# Patient Record
Sex: FEMALE | Race: WHITE | Employment: FULL TIME | URBAN - METROPOLITAN AREA
[De-identification: names, ages, dates, MRNs, and addresses within clinical notes are randomized per-mention and may not be internally consistent; named-entity substitution may affect disease eponyms.]

---

## 2022-09-19 ENCOUNTER — NURSE TRIAGE (OUTPATIENT)
Dept: NURSING | Facility: CLINIC | Age: 22
End: 2022-09-19

## 2022-09-19 NOTE — TELEPHONE ENCOUNTER
"Nurse Triage SBAR    Is this a 2nd Level Triage? NO    Situation: Patient calling with fast heart rate and mild chest pain radiating into neck, jaw. .  Consent: not needed    Background:  Pt is a student here from the [place].  Staying in student houseing.  Today hs been having  \"Heart palpitations\" , really strong beating throughout my body today.     Assessment:   HEARTRATE:  Fast - 109 bpm at this time, Pt denies skipped or extra beats.   STARTED:  Over the weekend - felt a little fast upon standing, and now it is quite constant.   CONSTANT at this time.   Pt is reporting a regular but fast heart rate.  States \"it doesn't feel irregular\".   Pt denies having this before.   Denies hx of heart disease.    Pt reporting mild chest pain.  States \"it feels quite like it's pounding quite hard\".  Reports feeling it in her collar bones, up neck, and in shoulders.  States she has a headache as a result.      Protocol Recommended Disposition:   ED NOW    Recommendation: Advised patient to Go to ED now . Reviewed concerning symptoms and when to call back.  Pt declined to be seen in the ED at this time.  States she feels it's getting a bit better.  Writer can hear that pt continues to have a shaky voice and taking shaky breaths.  Advised again to be seen in ED, but pt continues to decline.  States she will call back if she gets any worse.      Isabel Blevins RN [facility] 9/19/2022 4:58 PM    Reason for Disposition    Chest pain    Pain also in shoulder(s) or arm(s) or jaw (Exception: pain is clearly made worse by movement)    Additional Information    Negative: Shock suspected (e.g., cold/pale/clammy skin, too weak to stand, low BP, rapid pulse)    Negative: Passed out (i.e., lost consciousness, collapsed and was not responding)    Negative: Difficult to awaken or acting confused (e.g., disoriented, slurred speech)    Negative: Visible sweat on face or sweat dripping down face    Negative: Unable to " walk, or can only walk with assistance (e.g., requires support)    Negative: [1] Received SHOCK from implantable cardiac defibrillator AND [2] persisting symptoms (i.e., palpitations, lightheadedness)    Negative: [1] Dizziness, lightheadedness, or weakness AND [2] heart beating very rapidly (e.g., > 140 / minute)    Negative: [1] Dizziness, lightheadedness, or weakness AND [2] heart beating very slowly (e.g., < 50 / minute)    Negative: Sounds like a life-threatening emergency to the triager    Negative: Difficulty breathing    Negative: Dizziness, lightheadedness, or weakness    Negative: [1] Heart beating very rapidly (e.g., > 140 / minute) AND [2] present now  (Exception: during exercise)    Negative: Heart beating very slowly (e.g., < 50 / minute)  (Exception: athlete and heart rate normal for caller)    Negative: SEVERE difficulty breathing (e.g., struggling for each breath, speaks in single words)    Negative: Difficult to awaken or acting confused (e.g., disoriented, slurred speech)    Negative: Shock suspected (e.g., cold/pale/clammy skin, too weak to stand, low BP, rapid pulse)    Negative: Passed out (i.e., lost consciousness, collapsed and was not responding)    Negative: [1] Chest pain lasts > 5 minutes AND [2] age > 44    Negative: [1] Chest pain lasts > 5 minutes AND [2] age > 30 AND [3] one or more cardiac risk factors (e.g., diabetes, high blood pressure, high cholesterol, smoker, or strong family history of heart disease)    Negative: [1] Chest pain lasts > 5 minutes AND [2] history of heart disease (i.e., angina, heart attack, heart failure, bypass surgery, takes nitroglycerin)    Negative: [1] Chest pain lasts > 5 minutes AND [2] described as crushing, pressure-like, or heavy    Negative: Heart beating < 50 beats per minute OR > 140 beats per minute    Negative: Visible sweat on face or sweat dripping down face    Negative: Sounds like a life-threatening emergency to the triager    Negative:  "Followed a chest injury    Negative: SEVERE chest pain    Negative: [1] Chest pain (or \"angina\") comes and goes AND [2] is happening more often (increasing in frequency) or getting worse (increasing in severity) (Exception: chest pains that last only a few seconds)    Protocols used: HEART RATE AND HEARTBEAT [id number], CHEST PAIN-A-AH      "

## 2022-11-10 ENCOUNTER — APPOINTMENT (OUTPATIENT)
Dept: ULTRASOUND IMAGING | Facility: CLINIC | Age: 22
End: 2022-11-10
Attending: EMERGENCY MEDICINE
Payer: COMMERCIAL

## 2022-11-10 ENCOUNTER — HOSPITAL ENCOUNTER (EMERGENCY)
Facility: CLINIC | Age: 22
Discharge: HOME | End: 2022-11-10
Attending: EMERGENCY MEDICINE | Admitting: EMERGENCY MEDICINE
Payer: COMMERCIAL

## 2022-11-10 VITALS
SYSTOLIC BLOOD PRESSURE: 132 MMHG | OXYGEN SATURATION: 100 % | HEART RATE: 81 BPM | DIASTOLIC BLOOD PRESSURE: 82 MMHG | TEMPERATURE: 98.4 F | RESPIRATION RATE: 20 BRPM

## 2022-11-10 DIAGNOSIS — M79.662 PAIN OF LEFT LOWER LEG: ICD-10-CM

## 2022-11-10 PROCEDURE — 99284 EMERGENCY DEPT VISIT MOD MDM: CPT | Performed by: EMERGENCY MEDICINE

## 2022-11-10 PROCEDURE — 99284 EMERGENCY DEPT VISIT MOD MDM: CPT | Mod: 25 | Performed by: EMERGENCY MEDICINE

## 2022-11-10 PROCEDURE — 93971 EXTREMITY STUDY: CPT | Mod: LT

## 2022-11-10 PROCEDURE — 93971 EXTREMITY STUDY: CPT | Mod: 26 | Performed by: RADIOLOGY

## 2022-11-10 ASSESSMENT — ACTIVITIES OF DAILY LIVING (ADL): ADLS_ACUITY_SCORE: 35

## 2022-11-10 ASSESSMENT — ENCOUNTER SYMPTOMS
DIFFICULTY URINATING: 0
POLYDIPSIA: 0
MYALGIAS: 1
VOMITING: 0
ABDOMINAL PAIN: 0
NECK STIFFNESS: 0
LIGHT-HEADEDNESS: 0
HEADACHES: 0
NUMBNESS: 0
CHILLS: 0
FEVER: 0
CONFUSION: 0
ADENOPATHY: 0
BRUISES/BLEEDS EASILY: 0
WEAKNESS: 0
NAUSEA: 0
NECK PAIN: 0
ARTHRALGIAS: 0
EYE REDNESS: 0
SHORTNESS OF BREATH: 0
COLOR CHANGE: 0

## 2022-11-10 NOTE — ED PROVIDER NOTES
[facility] DE[facility] ([facility])  11/10/22    History     Chief Complaint   Patient presents with     Leg Pain     HPI  Jennifer Rucker is an otherwise healthy 22 year old female who presents to the ED for evaluation of left calf pain.  Patient did visit [facility] this morning who referred to the ED for evaluation of potential clots.  Patient reports she began having left calf pain a week ago which continued for 5 days until it went away 2 days ago, then returned yesterday.  Pain is cramping, mild, nonradiating, constant, nothing makes it better or worse.  She denies any trauma.  She denies history of DVT/PE or recent travel.  She did report having a headache this morning that was somewhat unusual for her, however, that has significantly proved and almost completely resolved by now.  She denies any neck pain, visual disturbance, nausea/vomiting.    Past Medical History  No past medical history on file.  No past surgical history on file.  No current outpatient medications on file.    No Known Allergies  Family History  No family history on file.  Social History       Past medical history, past surgical history, medications, allergies, family history, and social history were reviewed with the patient. No additional pertinent items.       Review of Systems   Constitutional: Negative for chills and fever.   HENT: Negative for congestion.    Eyes: Negative for redness.   Respiratory: Negative for shortness of breath.    Cardiovascular: Negative for chest pain and leg swelling.   Gastrointestinal: Negative for abdominal pain, nausea and vomiting.   Endocrine: Negative for polydipsia and polyuria.   Genitourinary: Negative for difficulty urinating.   Musculoskeletal: Positive for myalgias ( left calf). Negative for arthralgias, neck pain and neck stiffness.   Skin: Negative for color change.   Allergic/Immunologic: Negative for immunocompromised state.   Neurological: Negative for weakness,  light-headedness, numbness and headaches.   Hematological: Negative for adenopathy. Does not bruise/bleed easily.   Psychiatric/Behavioral: Negative for confusion.   All other systems reviewed and are negative.    A complete review of systems was performed with pertinent positives and negatives noted in the HPI, and all other systems negative.    Physical Exam   BP: 132/82  Pulse: 81  Temp: 98.4  F (36.9  C)  Resp: 20  SpO2: 100 %  Physical Exam  Vitals and nursing note reviewed.   Constitutional:       General: She is not in acute distress.     Appearance: Normal appearance. She is not ill-appearing, toxic-appearing or diaphoretic.   HENT:      Head: Normocephalic and atraumatic.      Mouth/Throat:      Mouth: Oropharynx is clear and moist.      Pharynx: No oropharyngeal exudate.   Eyes:      General: No scleral icterus.     Extraocular Movements: Extraocular movements intact.      Conjunctiva/sclera: Conjunctivae normal.   Cardiovascular:      Rate and Rhythm: Normal rate.      Pulses: Normal pulses and intact distal pulses.           Dorsalis pedis pulses are 2+ on the left side.        Posterior tibial pulses are 2+ on the left side.      Heart sounds: Normal heart sounds.   Pulmonary:      Effort: Pulmonary effort is normal. No respiratory distress.      Breath sounds: Normal breath sounds. No wheezing or rales.   Abdominal:      Palpations: Abdomen is soft.      Tenderness: There is no abdominal tenderness.   Musculoskeletal:         General: No swelling, tenderness, deformity, signs of injury or edema. Normal range of motion.      Cervical back: Normal range of motion and neck supple.      Right lower leg: No edema.      Left lower leg: No edema.      Comments: There is no tenderness to palpation of left lower extremity.  Compartments of left lower extremity are soft.  No calf tenderness.  No left knee or left ankle edema or tenderness.  Full range of motion, active and passive, in left hip, knee, and ankle  "without pain.   Skin:     General: Skin is warm.      Capillary Refill: Capillary refill takes less than 2 seconds.      Coloration: Skin is not pale.      Findings: No bruising, erythema or rash.      Comments: No skin discoloration in left lower extremity.   Neurological:      General: No focal deficit present.      Mental Status: She is alert and oriented to person, place, and time.      Cranial Nerves: No cranial nerve deficit.      Sensory: No sensory deficit.      Motor: No weakness.      Coordination: Coordination normal.   Psychiatric:         Mood and Affect: Mood normal.         Behavior: Behavior normal.         Thought Content: Thought content normal.         Judgment: Judgment normal.         ED Course      Procedures                   Results for orders placed or performed during the hospital encounter of 11/10/22   US Lower Extremity Venous Duplex Left     Status: None (Preliminary result)    Narrative    Exam: Ultrasound of the deep venous system of left leg dated  11/10/2022 12:55 PM    Clinical information: Pain. Concern for DVT.    Comparison: None    Ordering provider: Jean Carlos Emaunel MD    Technique: Guzmán-scale evaluation with compression and Doppler  assessment of deep venous system for spontaneous and phasic flow, as  well as the presence of distal augmentation. Color flow images  obtained as needed. Gray-scale images with compression of the great  saphenous vein obtained as needed.    Findings:    Left leg:    CFV: Thrombus: No, Phasic: Yes  Femoral vein, mid: Thrombus: No, Phasic: Yes  Popliteal vein: Thrombus: No, Phasic: Yes  PTV: Thrombus: Thrombus: No  Peroneal vein: Not well visualized.      Impression    Impression:  Left leg: No deep venous thrombosis. The peroneal vein was not well  visualized.     Reference: \"Duplex Ultrasound in the Diagnosis of Lower-Extremity Deep  Venous Thrombosis\"- Leigha Venegas MD, S; Francisco Hampton MD  (Circulation. 2014;129:917-921. " http://circ.ahajournals.org )     Medications - No data to display     Assessments & Plan (with Medical Decision Making)   22-year-old woman presenting with left calf pain for approximately 5 to 6 days.  Differential diagnosis: DVT, muscle spasm, nerve impingement.    After thorough history physical exam patient appears to be no acute distress.  I will obtain left lower extremity ultrasound for further diagnostic evaluation.  She agrees with the plan.    I reviewed patient's left lower extremity ultrasound and I read the radiology report; there is no evidence of DVT, however, peroneal vein was not quite well visualized on the study.  Patient was informed of these findings and she was advised to follow-up in 1 week for repeat ultrasound if symptoms persist.  She agrees with this plan.  No need for anticoagulation at this point in time.  She also agrees to return to respiratory symptoms significantly worsen.    I have reviewed the nursing notes. I have reviewed the findings, diagnosis, plan and need for follow up with the patient.    There are no discharge medications for this patient.      Final diagnoses:   Pain of left lower leg     IDino, am serving as a trained medical scribe to document services personally performed by Jean Carlos Emanuel MD, MD, based on the provider's statements to me.     IAdelfo Nikola, MD, MD, was physically present and have reviewed and verified the accuracy of this note documented by Dino Zapata.    --  Jean Carlos Emanuel MD  [facility] EMERGENCY DEPARTMENT  11/10/2022     Jean Carlos Emanuel MD  11/10/22 [id number]

## 2022-11-10 NOTE — ED TRIAGE NOTES
22 yr old female ambulatory to triage presenting with left calf pain. Has left calf pain for 5 days last week, then it went away, then came back yesterday. This morning woke up with worst headache she reports she has ever had in her life, 6/10 - does not normally get headaches. No recent travel. No hx of blood clots. Takes OCP. No known trauma. Pt went to [place] this morning but was told to come to ED to rule out blood clot.      Triage Assessment     Row Name 11/10/22 1059       Triage Assessment (Adult)    Airway WDL WDL       Respiratory WDL    Respiratory WDL WDL       Skin Circulation/Temperature WDL    Skin Circulation/Temperature WDL WDL       Cardiac WDL    Cardiac WDL WDL       Peripheral/Neurovascular WDL    Peripheral Neurovascular WDL WDL       Cognitive/Neuro/Behavioral WDL    Cognitive/Neuro/Behavioral WDL WDL

## 2022-11-10 NOTE — DISCHARGE INSTRUCTIONS
Please make an appointment to follow up with Primary Care - [facility] (phone: [phone]) in 7 days unless symptoms completely resolve.  Your ultrasound today showed no obvious blood clot in your left leg, however, occasionally small clots early in the disease presentation can be missed on the ultrasound study.  Therefore, if your symptoms do not resolve please follow-up with your primary care provider for a repeat ultrasound of your left lower extremity.  Also, if your symptoms significantly worsen please come back to the emergency department.

## 2023-02-12 ENCOUNTER — HEALTH MAINTENANCE LETTER (OUTPATIENT)
Age: 23
End: 2023-02-12

## 2024-03-10 ENCOUNTER — HEALTH MAINTENANCE LETTER (OUTPATIENT)
Age: 24
End: 2024-03-10

## 2025-03-16 ENCOUNTER — HEALTH MAINTENANCE LETTER (OUTPATIENT)
Age: 25
End: 2025-03-16